# Patient Record
Sex: FEMALE | Race: WHITE | NOT HISPANIC OR LATINO | Employment: FULL TIME | URBAN - METROPOLITAN AREA
[De-identification: names, ages, dates, MRNs, and addresses within clinical notes are randomized per-mention and may not be internally consistent; named-entity substitution may affect disease eponyms.]

---

## 2020-12-18 ENCOUNTER — APPOINTMENT (OUTPATIENT)
Dept: RADIOLOGY | Facility: MEDICAL CENTER | Age: 30
End: 2020-12-18
Attending: EMERGENCY MEDICINE
Payer: COMMERCIAL

## 2020-12-18 ENCOUNTER — HOSPITAL ENCOUNTER (EMERGENCY)
Facility: MEDICAL CENTER | Age: 30
End: 2020-12-18
Attending: EMERGENCY MEDICINE
Payer: COMMERCIAL

## 2020-12-18 VITALS
TEMPERATURE: 97.2 F | DIASTOLIC BLOOD PRESSURE: 86 MMHG | WEIGHT: 132.94 LBS | HEIGHT: 63 IN | SYSTOLIC BLOOD PRESSURE: 122 MMHG | OXYGEN SATURATION: 100 % | HEART RATE: 62 BPM | BODY MASS INDEX: 23.55 KG/M2 | RESPIRATION RATE: 18 BRPM

## 2020-12-18 DIAGNOSIS — S67.10XA CRUSHING INJURY OF FINGER OF LEFT HAND: ICD-10-CM

## 2020-12-18 PROCEDURE — 73120 X-RAY EXAM OF HAND: CPT | Mod: LT

## 2020-12-18 PROCEDURE — 29125 APPL SHORT ARM SPLINT STATIC: CPT

## 2020-12-18 PROCEDURE — 99283 EMERGENCY DEPT VISIT LOW MDM: CPT

## 2020-12-18 PROCEDURE — 302874 HCHG BANDAGE ACE 2 OR 3""

## 2020-12-18 SDOH — HEALTH STABILITY: MENTAL HEALTH: HOW OFTEN DO YOU HAVE A DRINK CONTAINING ALCOHOL?: MONTHLY OR LESS

## 2020-12-18 NOTE — LETTER
"  FORM C-4:  EMPLOYEE’S CLAIM FOR COMPENSATION/ REPORT OF INITIAL TREATMENT  EMPLOYEE’S CLAIM - PROVIDE ALL INFORMATION REQUESTED   First Name   Liz Last Name   Yaa Birthdate 1990  Sex female Claim Number   Home Address   [address]   [place]             [place] [address]                                   Age  30 y.o. Height  1.6 m (5' 3\") Weight  60.3 kg (132 lb 15 oz) [facility]     Mailing Address   [address]  [place]              [place] [address] Telephone  [phone] (home)  Primary Language Spoken   Insurer  [employer]   Third Party   [employer] Employee's Occupation (Job Title) When Injury or Occupational Disease Occurred      Employer's Name [employer] Telephone [phone]    Employer Address [address] [facility] [place] [facility] [address]] Zip [address]   Date of Injury  12/18/2020       Hour of Injury  6:00 AM Date Employer Notified  12/18/2020 Last Day of Work after Injury or Occupational Disease  12/18/2020 Supervisor to Whom Injury Reported  Safety    Address or Location of Accident (if applicable) [[address] [place] [address]]   What were you doing at the time of accident? (if applicable) Pulling a Pallet    How did this injury or occupational disease occur? Be specific and answer in detail. Use additional sheet if necessary)  Material on the pallet I was pulling fell, it squished my hand and hit my arm.    If you believe that you have an occupational disease, when did you first have knowledge of the disability and it relationship to your employment? N/A Witnesses to the Accident  N/A   Nature of Injury or Occupational Disease  Contusion Part(s) of Body Injured or Affected  Hand (L), Lower Arm (L), N/A    I CERTIFY THAT THE ABOVE IS TRUE AND CORRECT TO THE BEST OF MY KNOWLEDGE AND THAT I HAVE PROVIDED THIS INFORMATION IN ORDER TO OBTAIN THE BENEFITS " OF [place] IN[place]IVE OR CHAPTER 617 [place]).  I HEREBY AUTHORIZE ANY PHYSICIAN, CHIROPRACTOR, SURGEON, PRACTITIONER, OR OTHER PERSON, ANY HOSPITAL, INCLUDING [facility] OR [facility], ANY MEDICAL SERVICE ORGANIZATION, ANY INSURANCE COMPANY, OR OTHER INSTITUTION OR ORGANIZATION TO RELEASE TO EACH OTHER, ANY MEDICAL OR OTHER INFORMATION, INCLUDING BENEFITS PAID OR PAYABLE, PERTINENT TO THIS INJURY OR DISEASE, EXCEPT INFORMATION RELATIVE TO DIAGNOSIS, TREATMENT AND/OR COUNSELING FOR AIDS, PSYCHOLOGICAL CONDITIONS, ALCOHOL OR CONTROLLED SUBSTANCES, FOR WHICH I MUST GIVE SPECIFIC AUTHORIZATION.  A PHOTOSTAT OF THIS AUTHORIZATION SHALL BE AS VALID AS THE ORIGINAL.  Date   December 18, 2020                              Place    [facility]                       Employee’s Signature   THIS REPORT MUST BE COMPLETED AND MAILED WITHIN 3 WORKING DAYS OF TREATMENT   Place [facility], EMERGENCY DEPT                       Name of Facility [facility]   Date  12/18/2020 Diagnosis  (S67.10XA) Crushing injury of finger of left hand Is there evidence the injured employee was under the influence of alcohol and/or another controlled substance at the time of accident?   Hour  11:24 PM Description of Injury or Disease  Crushing injury of finger of left hand No   Treatment  Left hand x-ray reveals no fracture or dislocation, patient was placed in a volar splint for her comfort.  She is to take ibuprofen or Tylenol for pain, light duty at work with no left hand movement for the next 2 to 3 days and follow-up with [facility] in 2 days for recheck.  Have you advised the patient to remain off work five days or more?         No   X-Ray Findings  Negative If Yes   From Date    To Date      From information given by the employee, together with medical evidence, can you directly connect this  "injury or occupational disease as job incurred? Yes If No, is employee capable of: Full Duty  No Modified Duty  Yes   Is additional medical care by a physician indicated? Yes If Modified Duty, Specify any Limitations / Restrictions   Alternate duty with no left hand movement for 3 days or until released by [facility]   Do you know of any previous injury or disease contributing to this condition or occupational disease? No    Date 12/18/2020 Print Doctor’s Name Rosalinda Estrada certify the employer’s copy of this form was mailed on:   [address] [place] [id number] [phone] INSURER’S USE ONLY   Provider’s Tax ID Number [id number] Telephone Dept: [phone]    Doctor’s Signature e-ROSALINDA Hale D.O. Degree D.O.      Form C-4 (rev.10/07)                                                                         BRIEF DESCRIPTION OF RIGHTS AND BENEFITS  (Pursuant to [place] 616C.050)    Notice of Injury or Occupational Disease (Incident Report Form C-1): If an injury or occupational disease (OD) arises out of and in the course of employment, you must provide written notice to your employer as soon as practicable, but no later than 7 days after the accident or OD. Your employer shall maintain a sufficient supply of the required forms.    Claim for Compensation (Form C-4): If medical treatment is sought, the form C-4 is available at the place of initial treatment. A completed \"Claim for Compensation\" (Form C-4) must be filed within 90 days after an accident or OD. The treating physician or chiropractor must, within 3 working days after treatment, complete and mail to the employer, the employer's insurer and third-party , the Claim for Compensation.    Medical Treatment: If you require medical treatment for your on-the-job injury or OD, you may be required to select a physician or chiropractor from a list provided by your workers’ compensation insurer, if it has contracted with an " Organization for Managed Care (MCO) or Preferred Provider Organization (PPO) or providers of health care. If your employer has not entered into a contract with an MCO or PPO, you may select a physician or chiropractor from the Panel of Physicians and Chiropractors. Any medical costs related to your industrial injury or OD will be paid by your insurer.    Temporary Total Disability (TTD): If your doctor has certified that you are unable to work for a period of at least 5 consecutive days, or 5 cumulative days in a 20-day period, or places restrictions on you that your employer does not accommodate, you may be entitled to TTD compensation.    Temporary Partial Disability (TPD): If the wage you receive upon reemployment is less than the compensation for TTD to which you are entitled, the insurer may be required to pay you TPD compensation to make up the difference. TPD can only be paid for a maximum of 24 months.    Permanent Partial Disability (PPD): When your medical condition is stable and there is an indication of a PPD as a result of your injury or OD, within 30 days, your insurer must arrange for an evaluation by a rating physician or chiropractor to determine the degree of your PPD. The amount of your PPD award depends on the date of injury, the results of the PPD evaluation, your age and wage.    Permanent Total Disability (PTD): If you are medically certified by a treating physician or chiropractor as permanently and totally disabled and have been granted a PTD status by your insurer, you are entitled to receive monthly benefits not to exceed 66 2/3% of your average monthly wage. The amount of your PTD payments is subject to reduction if you previously received a lump-sum PPD award.    Vocational Rehabilitation Services: You may be eligible for vocational rehabilitation services if you are unable to return to the job due to a permanent physical impairment or permanent restrictions as a result of your injury or  occupational disease.    Transportation and Per Destinee Reimbursement: You may be eligible for travel expenses and per destinee associated with medical treatment.    Reopening: You may be able to reopen your claim if your condition worsens after claim closure.     Appeal Process: If you disagree with a written determination issued by the insurer or the insurer does not respond to your request, you may appeal to the Department of Administration, , by following the instructions contained in your determination letter. You must appeal the determination within 70 days from the date of the determination letter at [address], [address], [place], [place] [address], or [address], [address], [place], [place] [address]. If you disagree with the  decision, you may appeal to the Department of Administration, . You must file your appeal within 30 days from the date of the  decision letter at [address], [address], [place], [place] [address], or [address], [place], [place] [address]. If you disagree with a decision of an , you may file a petition for judicial review with the District Court. You must do so within 30 days of the Appeal Officer’s decision. You may be represented by an  at your own expense or you may contact the [facility] for possible representation.    [place]  for Injured Workers (NAIW): If you disagree with a  decision, you may request that NAIW represent you without charge at an  Hearing. For information regarding denial of benefits, you may contact the [facility] at: [address], [address], [place] [address], [phone], or [address], [place] [address], [phone]    To File a Complaint with the Division: If you wish to file a complaint with the  of the Division of Industrial Relations (DIR),  please  contact the Workers’ Compensation Section, [address], [address], [place], [place] [address], telephone [phone]) [phone], or [address], [address], [place], [place] [address], telephone [phone].    For assistance with Workers’ Compensation Issues: You may contact the [facility] Office for Consumer Health Assistance, [address], [address], [place], [place] [address], Toll Free 1-478.749.8884, Web site: http://[facility].[place].gov/Programs/HOLLIE E-mail: hollie@[facility].[place].gov  D-2 (rev. 10/20)              __________________________________________________________________                                    ___December 18, 2020_____            Employee Name / Signature                                                                                                                            Date

## 2020-12-19 NOTE — DISCHARGE INSTRUCTIONS
Wear the splint for your comfort, you may remove it for bathing.  Alternate between ice and moist heat i.e. warm soaks in Epsom salts  Tylenol or ibuprofen for pain  Light duty at work with no left hand movement for 3 days.  Follow-up with [facility] in 2 days.

## 2020-12-19 NOTE — ED PROVIDER NOTES
CHIEF COMPLAINT  Chief Complaint   Patient presents with   • Work-Related Injury     Patient's left hand was pinched under a pallet this morning. c/o pain to LUE 3rd digit and left forearm/elbow.       HPI  Liz Mon is a 30 y.o. female who presents tonight with a chief complaint of left hand multiple finger pain after she pinched her hand under a pallet at work this morning.  She works the night shift and apparently some items shifted off of the palate and smashed and pinned her hand.  She has extreme pain in her middle and ring fingers as well as her forearm and elbow.  She denies any other areas of injury.  She is up-to-date on vaccinations.  She has no known drug allergies.  She is right-hand dominant.    REVIEW OF SYSTEMS  See HPI for further details. All other system reviews are negative.    PAST MEDICAL HISTORY  Past Medical History:   Diagnosis Date   • Patient denies medical problems        FAMILY HISTORY  History reviewed. No pertinent family history.    SOCIAL HISTORY  Social History     Socioeconomic History   • Marital status: Single     Spouse name: Not on file   • Number of children: Not on file   • Years of education: Not on file   • Highest education level: Not on file   Occupational History   • Not on file   Social Needs   • Financial resource strain: Not on file   • Food insecurity     Worry: Not on file     Inability: Not on file   • Transportation needs     Medical: Not on file     Non-medical: Not on file   Tobacco Use   • Smoking status: Never Smoker   • Smokeless tobacco: Never Used   Substance and Sexual Activity   • Alcohol use: Yes     Alcohol/week: 1.5 oz     Types: 3 Cans of beer per week     Frequency: Monthly or less     Comment: social   • Drug use: Never   • Sexual activity: Yes     Partners: Male     Birth control/protection: Injection     Comment: LMP: week ago    Lifestyle   • Physical activity     Days per week: Not on file     Minutes per session: Not on file   •  "Stress: Not on file   Relationships   • Social connections     Talks on phone: Not on file     Gets together: Not on file     Attends Restorationism service: Not on file     Active member of club or organization: Not on file     Attends meetings of clubs or organizations: Not on file     Relationship status: Not on file   • Intimate partner violence     Fear of current or ex partner: Not on file     Emotionally abused: Not on file     Physically abused: Not on file     Forced sexual activity: Not on file   Other Topics Concern   • Not on file   Social History Narrative   • Not on file       SURGICAL HISTORY  History reviewed. No pertinent surgical history.    CURRENT MEDICATIONS  See nurses note    ALLERGIES  No Known Allergies    PHYSICAL EXAM  VITAL SIGNS: /86   Pulse 62   Temp 36.2 °C (97.2 °F)   Resp 18   Ht 1.6 m (5' 3\")   Wt 60.3 kg (132 lb 15 oz)   SpO2 100%   BMI 23.55 kg/m²     Constitutional: Patient is well developed, well nourished in mild distress from her hand injury.  HENT: Normocephalic. Oropharynx moist.   Eyes: PERRL, EOMI  Neck: Supple  Normal range of motion    Cardiovascular: Normal heart rate and rhythm. No murmur  Thorax & Lungs: Clear and equal breath sounds with good excursion. No respiratory distress  Abdomen: Bowel sounds normal in all four quadrants. Soft,nontender  Skin: Warm, Dry  Extremities: Peripheral pulses 4/4 left hand reveals moderate amount of soft tissue swelling at the PIP joints of the ring middle and index fingers.  There are no open abrasions, lacerations noted.  She has good capillary refill, good sensation with limited range of motion of the fingers.  She has marked tenderness of the PIP joint of the middle finger.  No other signs of trauma noted.  Musculoskeletal: Normal range of motion in all major joints.  Neurologic: Alert & oriented x 3, Normal motor function, Normal sensory function  Psychiatric: Affect normal, Judgment normal, Mood normal. "       RADIOLOGY/PROCEDURES  DX-HAND 2- LEFT   Final Result         1.  No radiographic evidence of acute traumatic injury.            COURSE & MEDICAL DECISION MAKING  Pertinent Labs & Imaging studies reviewed. (See chart for details)  X-ray performed reveals no acute fracture or subluxation.  Patient was placed in a volar splint just for protection for her hand.  She is to wear it for the next several days and take off for bathing only.  She is to be at light duty at work just to rest her hand.  Ibuprofen as needed for pain.  She is to follow-up with [facility] within the next 2 days for recheck and return of problems.  She is discharged in stable and improved condition.    FINAL IMPRESSION  1.  Left hand crush injury multiple fingers  2.   3.         Electronically signed by: Rosalinda Estrada D.O., 12/19/2020 6:25 [facility] Provider Note

## 2020-12-19 NOTE — ED TRIAGE NOTES
"Chief Complaint   Patient presents with   • Work-Related Injury     Patient's left hand was pinched under a pallet this morning. c/o pain to LUE 3rd digit and left forearm/elbow.     ED Triage Vitals [12/18/20 2147]   Enc Vitals Group      Blood Pressure 118/67      Pulse 68      Respiration 16      Temperature 36.6 °C (97.8 °F)      Temp src Temporal      Pulse Oximetry 98 %      Weight 60.3 kg (132 lb 15 oz)      Height 1.6 m (5' 3\")     "

## 2020-12-19 NOTE — ED NOTES
Pt given discharge instructions. RN answered questions. VSS. Pt ambulated steadily out to [facility].

## 2020-12-23 ENCOUNTER — OCCUPATIONAL MEDICINE (OUTPATIENT)
Dept: OCCUPATIONAL MEDICINE | Facility: CLINIC | Age: 30
End: 2020-12-23
Payer: COMMERCIAL

## 2020-12-23 VITALS
HEART RATE: 84 BPM | TEMPERATURE: 98 F | HEIGHT: 63 IN | BODY MASS INDEX: 22.61 KG/M2 | DIASTOLIC BLOOD PRESSURE: 68 MMHG | RESPIRATION RATE: 12 BRPM | SYSTOLIC BLOOD PRESSURE: 118 MMHG | OXYGEN SATURATION: 100 % | WEIGHT: 127.6 LBS

## 2020-12-23 DIAGNOSIS — S67.10XA CRUSHING INJURY OF FINGER OF LEFT HAND: ICD-10-CM

## 2020-12-23 PROCEDURE — 99212 OFFICE O/P EST SF 10 MIN: CPT | Performed by: NURSE PRACTITIONER

## 2020-12-23 ASSESSMENT — ENCOUNTER SYMPTOMS
CONSTITUTIONAL NEGATIVE: 1
MUSCULOSKELETAL NEGATIVE: 1
CARDIOVASCULAR NEGATIVE: 1
PSYCHIATRIC NEGATIVE: 1
NEUROLOGICAL NEGATIVE: 1
RESPIRATORY NEGATIVE: 1

## 2020-12-23 NOTE — PROGRESS NOTES
"Subjective:      Liz Mon is a 30 y.o. female who presents with Follow-Up (WC new2u DOI  12/18/20, 3rd digit, left hand/forearm/elbow, better, rm 17)      DOI 12/18/20: Pulling a pallet.  Material on the pallet I was pulling fell, it squished my hand and hit my arm.  Today feels significantly better.  She notes she no longer has discomfort or pain in her finger or arm.  She states she used ice the first day, has not had to use any since.  She has not had to take anything OTC for this injury.  She was on light duty without difficulty.  She feels she can be returned to full duty status.  Plan of care discussed with patient.     HPI    Review of Systems   Constitutional: Negative.    Respiratory: Negative.    Cardiovascular: Negative.    Musculoskeletal: Negative.    Skin:        Minimal visible bruising to the PIP of the left middle finger   Neurological: Negative.    Psychiatric/Behavioral: Negative.         ROS: All systems were reviewed on intake form, form was reviewed and signed. See scanned documents in media. Pertinent positives and negatives included in HPI.    PMH: No pertinent past medical history to this problem  MEDS: Medications were reviewed in Epic  ALLERGIES: No Known Allergies  SOCHX: Works as  at "[employer]  FH: No pertinent family history to this problem   Objective:     /68   Pulse 84   Temp 36.7 °C (98 °F)   Resp 12   Ht 1.6 m (5' 3\")   Wt 57.9 kg (127 lb 9.6 oz)   SpO2 100%   BMI 22.60 kg/m²      Physical Exam  Constitutional:       General: She is not in acute distress.     Appearance: Normal appearance. She is not ill-appearing.   Cardiovascular:      Rate and Rhythm: Normal rate and regular rhythm.      Pulses: Normal pulses.   Pulmonary:      Effort: Pulmonary effort is normal.   Musculoskeletal: Normal range of motion.         General: No swelling, tenderness, deformity or signs of injury.   Skin:     General: Skin is warm and dry.      Capillary " Refill: Capillary refill takes less than 2 seconds.      Findings: Bruising present. No erythema.   Neurological:      General: No focal deficit present.      Mental Status: She is alert and oriented to person, place, and time.      Cranial Nerves: No cranial nerve deficit.      Sensory: No sensory deficit.      Motor: No weakness.      Gait: Gait normal.   Psychiatric:         Mood and Affect: Mood normal.         Left hand: No obvious deformities noted.  Very minimal visible bruising noted to the PIP of the left middle finger.  Negative soft tissue swelling at the PIP joints of the ring middle and index fingers.  There are no open abrasions, lacerations noted.  Brisk capillary refill, good sensation.  Full range of motion.  Negative tenderness of the PIP joint of the middle finger.  No other signs of trauma noted.       Assessment/Plan:        1. Crushing injury of finger of left hand    Discharged/MMI  Released to full duty  Follow-up if needed

## 2020-12-23 NOTE — LETTER
[address],   [address] [place] [address]  Phone:  [phone] - Fax:  [phone]   Occupational Health [facility] Progress Report and Disability Certification  Date of Service: 12/23/2020   No Show:  No  Date / Time of Next Visit:   Discharged/MMI  Released to full duty   Claim Information   Patient Name: Liz Mon  Claim Number:     Employer: [employer]  Date of Injury: 12/18/2020     Insurer / TPA: [employer]  ID / SSN:     Occupation:   Diagnosis: The encounter diagnosis was Crushing injury of finger of left hand.    Medical Information   Related to Industrial Injury? Yes    Subjective Complaints:  DOI 12/18/20: Pulling a pallet.  Material on the pallet I was pulling fell, it squished my hand and hit my arm.  Today feels significantly better.  She notes she no longer has discomfort or pain in her finger or arm.  She states she used ice the first day, has not had to use any since.  She has not had to take anything OTC for this injury.  She was on light duty without difficulty.  She feels she can be returned to full duty status.  Plan of care discussed with patient.   Objective Findings: Left hand: No obvious deformities noted.  Very minimal visible bruising noted to the PIP of the left middle finger.  Negative soft tissue swelling at the PIP joints of the ring middle and index fingers.  There are no open abrasions, lacerations noted.  Brisk capillary refill, good sensation.  Full range of motion.  Negative tenderness of the PIP joint of the middle finger.  No other signs of trauma noted.   Pre-Existing Condition(s):     Assessment:   Condition Improved    Status: Discharged /  MMI  Permanent Disability:No    Plan:      Diagnostics:      Comments:  Discharged/MMI  Released to full duty  Follow-up if needed     Disability Information   Status: Released to Full Duty    From:   12/23/2020  Through:   Restrictions are:     Physical Restrictions   Sitting:    Standing:    Stooping:    Bending:      Squatting:    Walking:    Climbing:    Pushing:      Pulling:    Other:    Reaching Above Shoulder (L):   Reaching Above Shoulder (R):       Reaching Below Shoulder (L):    Reaching Below Shoulder (R):      Not to exceed Weight Limits   Carrying(hrs):   Weight Limit(lb):   Lifting(hrs):   Weight  Limit(lb):     Comments:      Repetitive Actions   Hands: i.e. Fine Manipulations from Grasping:     Feet: i.e. Operating Foot Controls:     Driving / Operate Machinery:     Provider Name:   GASTON Vergara Physician Signature:  Physician Name:     Clinic Name / Location: [address],   [address] [address] Clinic Phone Number: Dept: [phone]   Appointment Time: 8:30 Am Visit Start Time: 8:33 AM   Check-In Time:  8:22 Am Visit Discharge Time:  8:45am   Original-Treating Physician or Chiropractor    Page 2-Insurer/TPA    Page 3-Employer    Page 4-Employee